# Patient Record
Sex: MALE | Race: WHITE | NOT HISPANIC OR LATINO | Employment: STUDENT | URBAN - METROPOLITAN AREA
[De-identification: names, ages, dates, MRNs, and addresses within clinical notes are randomized per-mention and may not be internally consistent; named-entity substitution may affect disease eponyms.]

---
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
## 2022-05-09 ENCOUNTER — TELEPHONE (OUTPATIENT)
Dept: NEUROLOGY | Facility: OTHER | Age: 22
End: 2022-05-09

## 2022-05-09 NOTE — TELEPHONE ENCOUNTER
PT WANTED TO SEE HOW FAR WE ARE BOOKING OUT AND IF A REFERRAL IS REQUIRED. I ADVISED YES WE REQUIRE REFERRALS AND  [facility] IS CURRENTLY MIDWAY THROUGH June. HE STATED UNDERSTANDING